# Patient Record
Sex: FEMALE | Race: WHITE | NOT HISPANIC OR LATINO | URBAN - NONMETROPOLITAN AREA
[De-identification: names, ages, dates, MRNs, and addresses within clinical notes are randomized per-mention and may not be internally consistent; named-entity substitution may affect disease eponyms.]

---

## 2020-01-28 ENCOUNTER — IMPORTED ENCOUNTER (OUTPATIENT)
Dept: URBAN - NONMETROPOLITAN AREA CLINIC 1 | Facility: CLINIC | Age: 40
End: 2020-01-28

## 2020-01-28 PROBLEM — H52.223: Noted: 2020-01-28

## 2020-01-28 PROBLEM — H52.13: Noted: 2020-01-28

## 2020-01-28 PROCEDURE — V2520 CONTACT LENS HYDROPHILIC: HCPCS

## 2020-01-28 PROCEDURE — V2521 CNTCT LENS HYDROPHILIC TORIC: HCPCS

## 2020-01-28 PROCEDURE — 92015 DETERMINE REFRACTIVE STATE: CPT

## 2020-01-28 PROCEDURE — 92004 COMPRE OPH EXAM NEW PT 1/>: CPT

## 2020-01-28 PROCEDURE — 92310 CONTACT LENS FITTING OU: CPT

## 2020-01-28 NOTE — PATIENT DISCUSSION
Compound Myopic Astigmatism OU-  discussed findings w/patient-  new spectacle/CL Rx issued-  continue to monitor yearly or prn; 's Notes: MR 1/28/2020DFE 1/28/2020

## 2022-04-09 ASSESSMENT — TONOMETRY
OD_IOP_MMHG: 15
OS_IOP_MMHG: 15

## 2022-04-09 ASSESSMENT — VISUAL ACUITY
OD_SC: 20/20
OU_CC: 20/20-
OS_SC: 20/20